# Patient Record
(demographics unavailable — no encounter records)

---

## 2025-01-24 NOTE — ASSESSMENT
[FreeTextEntry1] : Health care maintenance: check blood work, blood drawn in office follow up with optometry/ophthalmology and dentist for routine exams when due follow up with GYN discussed Flu vaccine- patient will consider, declines for today recommend she bring in copy of her immunization records  Asthma: stable c/w Albuterol PRN c/w Symbicort PRN  Obesity: continue to improve upon diet and exercise recommend she consult with a nutritionist  discussed GLP-1 Agonists, risks and benefits, patient declines at this time, advised to reconsider if no improvement with weight loss

## 2025-01-24 NOTE — HEALTH RISK ASSESSMENT
[No] : In the past 12 months have you used drugs other than those required for medical reasons? No [0] : 2) Feeling down, depressed, or hopeless: Not at all (0) [PHQ-2 Negative - No further assessment needed] : PHQ-2 Negative - No further assessment needed [Former] : Former [CIS2Flaky] : 0

## 2025-01-24 NOTE — PHYSICAL EXAM
[No Acute Distress] : no acute distress [Well Nourished] : well nourished [Well Developed] : well developed [Well-Appearing] : well-appearing [Normal Sclera/Conjunctiva] : normal sclera/conjunctiva [PERRL] : pupils equal round and reactive to light [Normal Oropharynx] : the oropharynx was normal [Normal TMs] : both tympanic membranes were normal [Normal Appearance] : was normal in appearance [Neck Supple] : was supple [Normal] : the thyroid was normal [No Respiratory Distress] : no respiratory distress  [Clear to Auscultation] : lungs were clear to auscultation bilaterally [Normal Rate] : normal rate  [Regular Rhythm] : with a regular rhythm [Normal S1, S2] : normal S1 and S2 [No Murmur] : no murmur heard [No Carotid Bruits] : no carotid bruits [No Edema] : there was no peripheral edema [Soft] : abdomen soft [Non Tender] : non-tender [Normal Bowel Sounds] : normal bowel sounds [Normal Posterior Cervical Nodes] : no posterior cervical lymphadenopathy [Normal Anterior Cervical Nodes] : no anterior cervical lymphadenopathy [Grossly Normal Strength/Tone] : grossly normal strength/tone [No Rash] : no rash [No Focal Deficits] : no focal deficits [Alert and Oriented x3] : oriented to person, place, and time [Normal Mood] : the mood was normal

## 2025-01-24 NOTE — HISTORY OF PRESENT ILLNESS
[de-identified] : 22 year old female presents for an annual physical. She is concerned about her weight- has gained weight over the past few years. She stopped taking her birth control about a month ago to see if this would help. She had not been paying attention to her diet for the past couple of years, had been eating outside of the home more often. She made a change over the past few months- trying to eat more whole foods and avoid processed foods. She had participated in a study for her school last semester where she wore a Fitbit and monitored her steps and sleep. She felt she had been more active then- wants to improve upon her activity level.   Asthmatic- reports her asthma has been stable  she keeps Symbicort on hand  has allergies- on Allegra  has allergy to shellfish- requests refill for EpiPen   follows with GYN- had been on birth control up until a month ago  [FreeTextEntry1] : Annual physical

## 2025-03-19 NOTE — ASSESSMENT
[FreeTextEntry1] : EKG stable vitals, exam stable unclear etiology for chest pain/discomfort- may be muscular versus underlying reflux/indigestion trial of Famotidine, take as directed will check blood work, blood drawn in office will refer patient to cardiology  advised to go to the ER if symptoms worsen and/or become severe

## 2025-03-19 NOTE — HISTORY OF PRESENT ILLNESS
[FreeTextEntry8] : 22 year old female with h/o Asthma presents c/o chest discomfort and heaviness. She started to feel symptomatic 3 days ago while she had been out of the house, shopping. She reports developing chest tightness at the time, that had been worse with movement. She continued to shop and eventually went home. She states she felt better when she laid down. She reports feeling nauseous yesterday, but had no vomiting episode. She continues to experience chest discomfort/heaviness today. No cough, no fever. She has been eating raw garlic- thinking that will help with her cholesterol. She restarted taking birth control 2 weeks ago. She is not sure if she may have underlying indigestion.

## 2025-03-19 NOTE — REVIEW OF SYSTEMS
[Chest Pain] : chest pain [Fever] : no fever [Chills] : no chills [Palpitations] : no palpitations [Shortness Of Breath] : no shortness of breath [Cough] : no cough [Abdominal Pain] : no abdominal pain [Nausea] : nausea [Vomiting] : no vomiting

## 2025-07-18 NOTE — PHYSICAL EXAM
[No Acute Distress] : no acute distress [Well Nourished] : well nourished [Well Developed] : well developed [Well-Appearing] : well-appearing [Normal Appearance] : was normal in appearance [Neck Supple] : was supple [No Respiratory Distress] : no respiratory distress  [Clear to Auscultation] : lungs were clear to auscultation bilaterally [Normal Rate] : normal rate  [Regular Rhythm] : with a regular rhythm [Normal S1, S2] : normal S1 and S2 [No Murmur] : no murmur heard [Alert and Oriented x3] : oriented to person, place, and time [de-identified] : breasts appear to be enlarged  [de-identified] : + muscle tightness to upper back, cervical region

## 2025-07-18 NOTE — HISTORY OF PRESENT ILLNESS
[FreeTextEntry1] : Follow up  [de-identified] : 22 year old female with h/o Asthma, Allergies, Obesity presents for a follow up. She has h/o macromastia/enlarged breasts since being a young teenager. She c/o upper back pain often due to her enlarged breasts. She has been having difficulty exercising for long periods of time. She is interested in a breast reduction, has scheduled an appointment with a surgeon- requests referral.

## 2025-07-18 NOTE — ASSESSMENT
[FreeTextEntry1] : patient with macromastia causing back pain referred to surgeon for evaluation for possible breast reduction